# Patient Record
Sex: FEMALE | Race: WHITE | NOT HISPANIC OR LATINO | Employment: FULL TIME | URBAN - NONMETROPOLITAN AREA
[De-identification: names, ages, dates, MRNs, and addresses within clinical notes are randomized per-mention and may not be internally consistent; named-entity substitution may affect disease eponyms.]

---

## 2022-10-06 ENCOUNTER — NURSE TRIAGE (OUTPATIENT)
Dept: CALL CENTER | Facility: HOSPITAL | Age: 22
End: 2022-10-06

## 2022-10-06 NOTE — TELEPHONE ENCOUNTER
Caller is 30 weeks pregnant and is having flu symptoms.  She works from home and has had no known exposure to COVID or flu.  She has body aches, feels hot to the touch, and congestion.  Recommended calling OB GYN for instructions.  Reason for Disposition  • Patient is HIGH RISK (e.g., age > 64 years, pregnant, HIV+, or chronic medical condition)    Additional Information  • Negative: SEVERE difficulty breathing (e.g., struggling for each breath, speaks in single words)  • Negative: Difficult to awaken or acting confused (e.g., disoriented, slurred speech)  • Negative: Bluish (or gray) lips or face now  • Negative: Shock suspected (e.g., cold/pale/clammy skin, too weak to stand, low BP, rapid pulse)  • Negative: Sounds like a life-threatening emergency to the triager  • Negative: [1] Symptoms of COVID-19 (e.g., cough, fever, SOB, or others) AND [2] lives in an area with community spread  • Negative: [1] Sounds like a cold AND [2] no fever  • Negative: [1] Cough with sputum AND [2] no fever  • Negative: [1] Dry cough (no sputum) sputum AND [2] no fever  • Negative: [1] Throat pain AND [2] no fever  • Negative: Influenza vaccine reaction is suspected  • Negative: Chest pain  (Exception: MILD central chest pain, present only when coughing)  • Negative: [1] Headache AND [2] stiff neck (can't touch chin to chest)  • Negative: Fever > 104 F (40 C)  • Negative: [1] Difficulty breathing AND [2] not severe AND [3] not from stuffy nose (e.g., not relieved by cleaning out the nose)  • Negative: Patient sounds very sick or weak to the triager  • Negative: [1] Fever > 101 F (38.3 C) AND [2] age > 60 years  • Negative: [1] Fever > 100.0 F (37.8 C) AND [2] bedridden (e.g., nursing home patient, CVA, chronic illness, recovering from surgery)  • Negative: [1] Fever > 100.0 F (37.8 C) AND [2] diabetes mellitus or weak immune system (e.g., HIV positive, cancer chemo, splenectomy, organ transplant, chronic steroids)    Answer Assessment  "- Initial Assessment Questions  1. WORST SYMPTOM: \"What is your worst symptom?\" (e.g., cough, runny nose, muscle aches, headache, sore throat, fever)       Body aches  2. ONSET: \"When did your flu symptoms start?\"       Yesterday at 5 pm  3. COUGH: \"How bad is the cough?\"        mild  4. RESPIRATORY DISTRESS: \"Describe your breathing.\"       ok  5. FEVER: \"Do you have a fever?\" If Yes, ask: \"What is your temperature, how was it measured, and when did it start?\"      Feel hot  6. EXPOSURE: \"Were you exposed to someone with influenza?\"        no  7. FLU VACCINE: \"Did you get a flu shot this year?\"      no  8. HIGH RISK DISEASE: \"Do you any chronic medical problems?\" (e.g., heart or lung disease, asthma, weak immune system, or other HIGH RISK conditions)      Pregnant   9. PREGNANCY: \"Is there any chance you are pregnant?\" \"When was your last menstrual period?\"      30 weeks pregnant  10. OTHER SYMPTOMS: \"Do you have any other symptoms?\"  (e.g., runny nose, muscle aches, headache, sore throat)        no    Protocols used: INFLUENZA - SEASONAL-ADULT-AH    "

## 2024-02-29 ENCOUNTER — OFFICE VISIT (OUTPATIENT)
Dept: INTERNAL MEDICINE | Facility: CLINIC | Age: 24
End: 2024-02-29
Payer: MEDICAID

## 2024-02-29 VITALS
TEMPERATURE: 98.9 F | DIASTOLIC BLOOD PRESSURE: 78 MMHG | HEIGHT: 59 IN | WEIGHT: 173 LBS | BODY MASS INDEX: 34.88 KG/M2 | SYSTOLIC BLOOD PRESSURE: 108 MMHG | HEART RATE: 96 BPM

## 2024-02-29 DIAGNOSIS — Z13.21 ENCOUNTER FOR VITAMIN DEFICIENCY SCREENING: ICD-10-CM

## 2024-02-29 DIAGNOSIS — H93.8X3 SENSATION OF FULLNESS IN BOTH EARS: ICD-10-CM

## 2024-02-29 DIAGNOSIS — F41.9 ANXIETY: ICD-10-CM

## 2024-02-29 DIAGNOSIS — R09.81 NASAL CONGESTION: ICD-10-CM

## 2024-02-29 DIAGNOSIS — Z11.59 ENCOUNTER FOR HEPATITIS C SCREENING TEST FOR LOW RISK PATIENT: ICD-10-CM

## 2024-02-29 DIAGNOSIS — Z13.220 LIPID SCREENING: ICD-10-CM

## 2024-02-29 DIAGNOSIS — Z00.00 ENCOUNTER FOR MEDICAL EXAMINATION TO ESTABLISH CARE: Primary | ICD-10-CM

## 2024-02-29 DIAGNOSIS — Z13.29 SCREENING FOR ENDOCRINE DISORDER: ICD-10-CM

## 2024-02-29 DIAGNOSIS — J01.00 ACUTE NON-RECURRENT MAXILLARY SINUSITIS: ICD-10-CM

## 2024-02-29 RX ORDER — AZITHROMYCIN 250 MG/1
TABLET, FILM COATED ORAL
Qty: 6 TABLET | Refills: 0 | Status: SHIPPED | OUTPATIENT
Start: 2024-02-29

## 2024-02-29 RX ORDER — FLUTICASONE PROPIONATE 50 MCG
2 SPRAY, SUSPENSION (ML) NASAL DAILY
Qty: 18.2 ML | Refills: 1 | Status: SHIPPED | OUTPATIENT
Start: 2024-02-29

## 2024-02-29 NOTE — PROGRESS NOTES
Office Note     Name: Amarilys Solis    : 2000     MRN: [id number]   Care Team: Patient Care Team:  Adenike Doyle APRN as PCP - General (Family Medicine)    Chief Complaint  Sinus Problem (Sinus congestion, drainage, and ear pain ) and Hearing Problem (Left ear )    Subjective     History of Present Illness:  The patient is a 23-year-old female who is here as a new patient.    Health maintenance   She does not have a primary care provider. She has no concerns with her medical history. She has not had any surgeries. Her last labs were done when she was pregnant.     Anxiety   She was supposed to take medication when she had her second child, but she did not take it. She does not remember the name of the medication. She still experiences anxiety daily but is not currently taking any medication. She discussed finding a therapist with her OB/GYN, but they never followed up with her. She is interested in starting medication, but she is forgetful with taking it.     Congestion  The patient has congestion, popping in her ears, and decreased hearing. This has been ongoing for 3 weeks. She has used over-the-counter nasal spray and a shower vapor tablet without improvement. She denies a cough. She experiences frequent nasal congestion but is not frequently symptomatic in her ears. She denies any sinus pain or pressure, fever, or pain with swallowing. She has had a few headaches. She denies any allergies.      She vapes nicotine daily. She drinks alcohol socially, less than once weekly. She denies any drug use.     She has 2 children, who are 7 years old and 1 year old. She denies any complications during labor and delivery at [facility].      She denies any allergies.    Review of systems:  anxiety  nasal congestion  ears popping   decreased hearing  Past Medical History:   Diagnosis Date    Headache        Past Surgical History:   Procedure Laterality Date    DENTAL PROCEDURE    "      Social History     Socioeconomic History    Marital status: Single   Tobacco Use    Smoking status: Never    Smokeless tobacco: Never   Vaping Use    Vaping status: Every Day    Start date: 2/28/2022    Substances: Nicotine, Flavoring   Substance and Sexual Activity    Alcohol use: Yes     Comment: socially    Drug use: Never         Current Outpatient Medications:     azithromycin (Zithromax Z-Tyrell) 250 MG tablet, Take 2 tablets by mouth on day 1, then 1 tablet daily on days 2-5, Disp: 6 tablet, Rfl: 0    fluticasone (FLONASE) 50 MCG/ACT nasal spray, 2 sprays into the nostril(s) as directed by provider Daily., Disp: 18.2 mL, Rfl: 1    Procedures    PHQ-9 Total Score:      Objective     Vital Signs  /78 (BP Location: Right arm, Patient Position: Sitting, Cuff Size: Adult)   Pulse 96   Temp 98.9 °F (37.2 °C) (Temporal)   Ht 151 cm (59.45\")   Wt 78.5 kg (173 lb)   BMI 34.42 kg/m²   Estimated body mass index is 34.42 kg/m² as calculated from the following:    Height as of this encounter: 151 cm (59.45\").    Weight as of this encounter: 78.5 kg (173 lb).    BMI is >= 30 and <35. (Class 1 Obesity). The following options were offered after discussion;: exercise counseling/recommendations and nutrition counseling/recommendations      Physical Exam  Vitals and nursing note reviewed.   HENT:      Head: Normocephalic.      Right Ear: Tympanic membrane, ear canal and external ear normal.      Left Ear: Tympanic membrane, ear canal and external ear normal.      Nose: Congestion present.      Right Sinus: Maxillary sinus tenderness present.      Left Sinus: Maxillary sinus tenderness present.      Mouth/Throat:      Mouth: Mucous membranes are moist.      Pharynx: Posterior oropharyngeal erythema present.   Eyes:      Pupils: Pupils are equal, round, and reactive to light.   Cardiovascular:      Rate and Rhythm: Normal rate.   Pulmonary:      Effort: Pulmonary effort is normal.      Breath sounds: Normal breath " sounds.   Skin:     General: Skin is warm and dry.   Neurological:      General: No focal deficit present.      Mental Status: She is alert and oriented to person, place, and time.   Psychiatric:         Mood and Affect: Mood normal.         Behavior: Behavior normal.         Thought Content: Thought content normal.         Judgment: Judgment normal.              Assessment and Plan     Assessment/Plan:  Diagnoses and all orders for this visit:    1. Encounter for medical examination to establish care (Primary)  -     CBC & Differential; Future  -     Comprehensive Metabolic Panel; Future  -Labs pending.  -Discussed and encouraged Amarilys to continue to attempt an overall healthy well-balanced diet.  -Discussed and encouraged Amarilys to continue to attempt 30 minutes of moderate intensity exercise daily.    2. Anxiety  -     Ambulatory Referral to Behavioral Health  -     Pharmacogenetic Testing (PGT) - Saliva, Oral Cavity; Future    3. Nasal congestion    4. Sensation of fullness in both ears    5. Lipid screening  -     Lipid Panel; Future    6. Screening for endocrine disorder  -     TSH Rfx On Abnormal To Free T4; Future    7. Encounter for vitamin deficiency screening  -     Vitamin D,25-Hydroxy; Future  -     Vitamin B12; Future    8. Encounter for hepatitis C screening test for low risk patient  -     Hepatitis C Antibody; Future    9. Acute non-recurrent maxillary sinusitis  -     azithromycin (Zithromax Z-Tyrell) 250 MG tablet; Take 2 tablets by mouth on day 1, then 1 tablet daily on days 2-5  Dispense: 6 tablet; Refill: 0  -     fluticasone (FLONASE) 50 MCG/ACT nasal spray; 2 sprays into the nostril(s) as directed by provider Daily.  Dispense: 18.2 mL; Refill: 1      The patient will follow up in 1 month for an annual physical.  There are no Patient Instructions on file for this visit.  Plan of care reviewed with patient at the conclusion of today's visit. Education was provided regarding diagnosis, management  and any prescribed or recommended OTC medications.  Patient verbalizes understanding of and agreement with management plan.    Follow Up  Return in about 1 month (around 3/29/2024) for Annual Physical next visit.    CHARMAINE Lyles     Transcribed from ambient dictation for CHARMAINE Lyles by Connie Martinez.  02/29/24   16:49 EST    Patient or patient representative verbalized consent to the visit recording.  I have personally performed the services described in this document as transcribed by the above individual, and it is both accurate and complete.

## 2024-03-26 ENCOUNTER — OFFICE VISIT (OUTPATIENT)
Age: 24
End: 2024-03-26
Payer: MEDICAID

## 2024-03-26 DIAGNOSIS — F41.9 ANXIETY: Primary | ICD-10-CM

## 2024-03-26 DIAGNOSIS — F32.A DEPRESSION, UNSPECIFIED DEPRESSION TYPE: ICD-10-CM
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
## 2024-03-26 NOTE — PATIENT INSTRUCTIONS
This is not journaling prompts traditionally, but can lead to journaling.  Check it out and see what you think!        Progressive muscle relaxation script to listen:    Visit the website linked below and enter the code to access the Progressive Muscle Relaxation activity:    Website: [employer]/activity  Code: [email or web address]      See attached information on cognitive behavioral therapy.

## 2024-03-29 ENCOUNTER — LAB (OUTPATIENT)
Dept: LAB | Facility: HOSPITAL | Age: 24
End: 2024-03-29
Payer: MEDICAID

## 2024-03-29 ENCOUNTER — OFFICE VISIT (OUTPATIENT)
Dept: INTERNAL MEDICINE | Facility: CLINIC | Age: 24
End: 2024-03-29
Payer: MEDICAID

## 2024-03-29 VITALS
HEART RATE: 80 BPM | DIASTOLIC BLOOD PRESSURE: 62 MMHG | BODY MASS INDEX: 35.48 KG/M2 | TEMPERATURE: 98 F | SYSTOLIC BLOOD PRESSURE: 102 MMHG | WEIGHT: 176 LBS | HEIGHT: 59 IN

## 2024-03-29 DIAGNOSIS — Z11.59 ENCOUNTER FOR HEPATITIS C SCREENING TEST FOR LOW RISK PATIENT: ICD-10-CM

## 2024-03-29 DIAGNOSIS — F32.A DEPRESSION, UNSPECIFIED DEPRESSION TYPE: ICD-10-CM

## 2024-03-29 DIAGNOSIS — Z00.00 ANNUAL PHYSICAL EXAM: Primary | ICD-10-CM

## 2024-03-29 DIAGNOSIS — J35.8 CALCULUS OF TONSIL: ICD-10-CM

## 2024-03-29 DIAGNOSIS — Z13.220 LIPID SCREENING: ICD-10-CM

## 2024-03-29 DIAGNOSIS — Z13.1 DIABETES MELLITUS SCREENING: ICD-10-CM

## 2024-03-29 DIAGNOSIS — Z13.29 SCREENING FOR THYROID DISORDER: ICD-10-CM

## 2024-03-29 DIAGNOSIS — R53.83 FATIGUE, UNSPECIFIED TYPE: ICD-10-CM

## 2024-03-29 DIAGNOSIS — Z00.00 ANNUAL PHYSICAL EXAM: ICD-10-CM

## 2024-03-29 DIAGNOSIS — Z00.00 ENCOUNTER FOR MEDICAL EXAMINATION TO ESTABLISH CARE: ICD-10-CM

## 2024-03-29 DIAGNOSIS — Z13.21 ENCOUNTER FOR VITAMIN DEFICIENCY SCREENING: ICD-10-CM

## 2024-03-29 DIAGNOSIS — Z13.29 SCREENING FOR ENDOCRINE DISORDER: ICD-10-CM

## 2024-03-29 DIAGNOSIS — F41.9 ANXIETY: ICD-10-CM

## 2024-03-29 LAB — HBA1C MFR BLD: 5.2 % (ref 4.8–5.6)

## 2024-03-29 PROCEDURE — 82306 VITAMIN D 25 HYDROXY: CPT

## 2024-03-29 PROCEDURE — 86803 HEPATITIS C AB TEST: CPT

## 2024-03-29 PROCEDURE — 84439 ASSAY OF FREE THYROXINE: CPT

## 2024-03-29 PROCEDURE — 82607 VITAMIN B-12: CPT

## 2024-03-29 PROCEDURE — 80061 LIPID PANEL: CPT

## 2024-03-29 PROCEDURE — 36415 COLL VENOUS BLD VENIPUNCTURE: CPT

## 2024-03-29 PROCEDURE — 80050 GENERAL HEALTH PANEL: CPT

## 2024-03-29 PROCEDURE — 83036 HEMOGLOBIN GLYCOSYLATED A1C: CPT

## 2024-03-29 RX ORDER — FLUOXETINE HYDROCHLORIDE 20 MG/1
20 CAPSULE ORAL DAILY
Qty: 90 CAPSULE | Refills: 0 | Status: SHIPPED | OUTPATIENT
Start: 2024-03-29

## 2024-03-29 NOTE — PROGRESS NOTES
Female Annual Physical Note      Name: Amarilys Solis    : 2000     MRN: [id number]   Care Team:  Patient Care Team:  Adenike Doyle APRN as PCP - General (Family Medicine)    Chief Complaint  Annual Exam    Subjective     History of Present Illness:  ***DAXHPI      The patient is being seen for a health maintenance evaluation.    Past Medical History:   Diagnosis Date    Headache      Past Surgical History:   Procedure Laterality Date    DENTAL PROCEDURE       Family History   Problem Relation Age of Onset    Lupus Mother     Ovarian cancer Maternal Grandmother      Social History     Tobacco Use   Smoking Status Never   Smokeless Tobacco Never     No Known Allergies    Current Outpatient Medications:     azithromycin (Zithromax Z-Tyrell) 250 MG tablet, Take 2 tablets by mouth on day 1, then 1 tablet daily on days 2-5, Disp: 6 tablet, Rfl: 0    fluticasone (FLONASE) 50 MCG/ACT nasal spray, 2 sprays into the nostril(s) as directed by provider Daily., Disp: 18.2 mL, Rfl: 1      General History  Amarilys  does have regular dental visits.  She does not complain of vision problems. Last eye exam was 6 months ago.  Immunizations are not up to date. The patient needs the following immunizations: Covid/FLU (pt refused)    Lifestyle  Amarilys  consumes a mixture of healthy and unhealthy foods.  She exercises rarely.    Reproductive Health  Amarilys  is premenopausal.  She reports periods are irregular.   She is sexually active. Her contraceptive plan is condoms.    Screening  Last pap was 2023  Last Completed Pap Smear       This patient has no relevant Health Maintenance data.        . History of abnormal pap smear or family history of gyn cancer: no abnormal pap. Family hx of gynecological cancer, maternal grandmother had an unknown gyn cancer.    Last mammogram was n/a  Last Completed Mammogram       This patient has no relevant Health Maintenance data.        . Personal or family history of abnormal  mammograms or breast cancer: no fam hx  Last colonoscopy was n/a  Last Completed Colonoscopy       This patient has no relevant Health Maintenance data.        . Family history of colon cancer: no fam hx  Last DEXA was n/a.    PHQ-9 Total Score:      Health Maintenance Summary            Ordered - HEPATITIS C SCREENING (Once) Ordered on 2/29/2024      No completion, postpone, or frequency change history exists for this topic.              Overdue - ANNUAL PHYSICAL (Yearly) Never done      No completion, postpone, or frequency change history exists for this topic.              Overdue - PAP SMEAR (Every 3 Years) Never done      No completion, postpone, or frequency change history exists for this topic.              Overdue - INFLUENZA VACCINE (Yearly - August to March) Never done      No completion, postpone, or frequency change history exists for this topic.              Overdue - COVID-19 Vaccine (1 - 2023-24 season) Never done      No completion, postpone, or frequency change history exists for this topic.              BMI FOLLOWUP (Yearly) Next due on 2/28/2025 02/29/2024  SmartData: WORKFLOW - QUALITY MEASUREMENT - DOCUMENTED WEIGHT FOLLOW-UP PLAN              TDAP/TD VACCINES (3 - Td or Tdap) Next due on 9/19/2032 09/19/2022  Imm Admin: Tdap    07/15/2013  Imm Admin: Tdap              Pneumococcal Vaccine 0-64 (Series Information) Aged Out      05/03/2002  Imm Admin: PEDS-Pneumococcal Conjugate (PCV7)    02/14/2002  Imm Admin: PEDS-Pneumococcal Conjugate (PCV7)    2001  Imm Admin: PEDS-Pneumococcal Conjugate (PCV7)              MENINGOCOCCAL VACCINE (Series Information) Aged Out      07/15/2013  Imm Admin: MCV4 Unspecified              HPV VACCINES (Series Information) Completed      10/03/2023  Imm Admin: Hpv9    05/17/2023  Imm Admin: Hpv9    03/17/2023  Imm Admin: Hpv9    10/11/2018  Imm Admin: Hpv9                  Immunization History   Administered Date(s) Administered    DTaP,  "Unspecified 2001, 02/14/2002, 05/03/2002, 08/05/2002, 07/21/2006    Hep A, 2 Dose 07/15/2013, 10/11/2018    Hep B, Adolescent or Pediatric 2001, 02/14/2002, 08/05/2002    HiB 2001, 02/14/2002, 08/05/2002    Hpv9 10/11/2018, 03/17/2023, 05/17/2023, 10/03/2023    IPV 2001, 02/14/2002, 05/03/2002, 08/05/2002, 10/11/2018    MCV4 Unspecified 07/15/2013    MMR 08/15/2003, 07/21/2006    Meningococcal B,(Bexsero) 10/11/2018    PEDS-Pneumococcal Conjugate (PCV7) 2001, 02/14/2002, 05/03/2002    Tdap 07/15/2013, 09/19/2022    Varicella 08/15/2003, 07/15/2013       Objective     Vital Signs  There were no vitals filed for this visit.  Estimated body mass index is 34.42 kg/m² as calculated from the following:    Height as of 2/29/24: 151 cm (59.45\").    Weight as of 2/29/24: 78.5 kg (173 lb).            Physical Exam       ***DAXRESULTS  Assessment and Plan     There are no diagnoses linked to this encounter.  ***DAXPLAN    There are no Patient Instructions on file for this visit.    Counseling/Anticipatory Guidance:   Plan of care reviewed with patient at the conclusion of today's visit. Education was provided in regards to diagnosis, diet and exercise, cervical cancer screening, self breast exams, breast cancer screening, and the importance of yearly mammograms.   Nutrition, family planning/contraception, physical activity, healthy weight,ways to reduce stress, adequate sleep, injury prevention, misuse of tobacco, alcohol and drugs, sexual behavior and STD's, dental health, mental health, and immunizations.    Management and any prescribed or recommended OTC medications.  Patient verbalizes understanding of and agreement with management plan.    Follow Up  No follow-ups on file.    CHARMAINE Martino  [facility] Medicine     "

## 2024-03-29 NOTE — PROGRESS NOTES
Female Annual Physical Note      Name: Amarilys Solis    : 2000     MRN: [id number]   Care Team:  Patient Care Team:  Adenike Doyle APRN as PCP - General (Family Medicine)    Chief Complaint  Annual Exam    Subjective     History of Present Illness:  The patient is a 23-year-old female who is here for an annual physical.    Tonsil stones  She had a doctor's appointment a few weeks ago, but she forgot to mention a couple of concerns. She has tonsil stones that recur in less than 1 week. They are bothersome and have been an issue for years. She bought a kit from [employer] but was unable to resolve them herself.     Fatigue  She has low energy and fatigue, She is not sure if she has had labs.    Anxiety and depression  The patient is interested in medication for her anxiety and depression. She took Zoloft when she had postpartum, and it worked well for her. She is not currently pregnant or breastfeeding. She has discussed anxiety with her previous provider.     Health maintenance   The patient has regular dentist appointments and has eye examinations as needed. She denies any vision problems with her glasses on. Her last eye examination was 6 months ago. Her diet consists of chicken, grilled beef, vegetables, and fruits. Her diet is both healthy and unhealthy. She rarely exercises. Her menstrual cycles have been abnormal since her 2 pregnancies. She stopped taking birth control in 2023 and has had 1 menstrual cycle then. She is sexually active and uses condoms. Her last Pap smear was in 2023 or 2023, which was normal. She did an STD screening that was positive for oral herpes simplex virus. She has not had any cold sores on her mouth. She denies that any cancer cells were found.    She vapes, which contains nicotine. She is interested in cessation.     Her maternal grandmother had gynecological cancers. She denies any family history of breast cancer or colon cancer.    She has not had an  influenza vaccine this season or any of the COVID-19 vaccines, which she declines interest in receiving.       The patient is being seen for a health maintenance evaluation.    Past Medical History:   Diagnosis Date    Headache      Past Surgical History:   Procedure Laterality Date    DENTAL PROCEDURE  2015     Family History   Problem Relation Age of Onset    Lupus Mother     Ovarian cancer Maternal Grandmother      Social History     Tobacco Use   Smoking Status Never   Smokeless Tobacco Never     No Known Allergies    Current Outpatient Medications:     FLUoxetine (PROzac) 20 MG capsule, Take 1 capsule by mouth Daily., Disp: 90 capsule, Rfl: 0    vitamin D (ERGOCALCIFEROL) 1.25 MG (90919 UT) capsule capsule, Take 1 capsule by mouth 1 (One) Time Per Week., Disp: 5 capsule, Rfl: 2      General History  Amarilys  does have regular dental visits.  She does not complain of vision problems. Last eye exam was 6 months ago.  Immunizations are not up to date. The patient needs the following immunizations: Covid/FLU (pt refused)    Lifestyle  Amarilys  consumes a mixture of healthy and unhealthy foods.  She exercises rarely.    Reproductive Health  Amarilys  is premenopausal.  She reports periods are irregular.   She is sexually active. Her contraceptive plan is condoms.    Screening  Last pap was July 2023  Last Completed Pap Smear            PAP SMEAR (Every 3 Years) Next due on 8/1/2026 08/01/2023  Done                . History of abnormal pap smear or family history of gyn cancer: no abnormal pap. Family hx of gynecological cancer, maternal grandmother had an unknown gyn cancer.    Last mammogram was n/a  Last Completed Mammogram       This patient has no relevant Health Maintenance data.        . Personal or family history of abnormal mammograms or breast cancer: no fam hx  Last colonoscopy was n/a  Last Completed Colonoscopy       This patient has no relevant Health Maintenance data.        . Family history of colon  cancer: no fam hx  Last DEXA was n/a.    PHQ-9 Total Score:      Health Maintenance Summary            Overdue - COVID-19 Vaccine (1 - 2023-24 season) Never done      03/29/2024  Postponed until 3/31/2024 by Debo Veras APRN Student (Patient Refused)              INFLUENZA VACCINE (Yearly - August to March) Next due on 8/1/2024 03/29/2024  Postponed until 3/31/2024 by Debo Veras APRN Student (Patient Refused)              BMI FOLLOWUP (Yearly) Next due on 2/28/2025 02/29/2024  SmartData: WORKFLOW - QUALITY MEASUREMENT - DOCUMENTED WEIGHT FOLLOW-UP PLAN              ANNUAL PHYSICAL (Yearly) Next due on 3/29/2025      03/29/2024  Done              PAP SMEAR (Every 3 Years) Next due on 8/1/2026 08/01/2023  Done              TDAP/TD VACCINES (3 - Td or Tdap) Next due on 9/19/2032 09/19/2022  Imm Admin: Tdap    07/15/2013  Imm Admin: Tdap              Pneumococcal Vaccine 0-64 (Series Information) Aged Out      05/03/2002  Imm Admin: PEDS-Pneumococcal Conjugate (PCV7)    02/14/2002  Imm Admin: PEDS-Pneumococcal Conjugate (PCV7)    2001  Imm Admin: PEDS-Pneumococcal Conjugate (PCV7)              MENINGOCOCCAL VACCINE (Series Information) Aged Out      07/15/2013  Imm Admin: MCV4 Unspecified              HPV VACCINES (Series Information) Completed      10/03/2023  Imm Admin: Hpv9    05/17/2023  Imm Admin: Hpv9    03/17/2023  Imm Admin: Hpv9    10/11/2018  Imm Admin: Hpv9              HEPATITIS C SCREENING  Completed      03/29/2024  Hepatitis C Antibody                  Immunization History   Administered Date(s) Administered    DTaP, Unspecified 2001, 02/14/2002, 05/03/2002, 08/05/2002, 07/21/2006    Hep A, 2 Dose 07/15/2013, 10/11/2018    Hep B, Adolescent or Pediatric 2001, 02/14/2002, 08/05/2002    HiB 2001, 02/14/2002, 08/05/2002    Hpv9 10/11/2018, 03/17/2023, 05/17/2023, 10/03/2023    IPV 2001, 02/14/2002, 05/03/2002, 08/05/2002, 10/11/2018    [facility] [place]  "Unspecified 07/15/2013    MMR 08/15/2003, 07/21/2006    Meningococcal B,(Bexsero) 10/11/2018    PEDS-Pneumococcal Conjugate (PCV7) 2001, 02/14/2002, 05/03/2002    Tdap 07/15/2013, 09/19/2022    Varicella 08/15/2003, 07/15/2013       Objective     Vital Signs  Vitals:    03/29/24 1018   BP: 102/62   BP Location: Left arm   Patient Position: Sitting   Cuff Size: Adult   Pulse: 80   Temp: 98 °F (36.7 °C)   TempSrc: Temporal   Weight: 79.8 kg (176 lb)   Height: 151 cm (59.45\")   PainSc: 0-No pain     Estimated body mass index is 35.01 kg/m² as calculated from the following:    Height as of this encounter: 151 cm (59.45\").    Weight as of this encounter: 79.8 kg (176 lb).            Physical Exam  HENT:      Head: Normocephalic.      Right Ear: Tympanic membrane, ear canal and external ear normal.      Left Ear: Tympanic membrane, ear canal and external ear normal.      Nose: Nose normal.      Mouth/Throat:      Mouth: Mucous membranes are moist.      Pharynx: Oropharynx is clear.   Eyes:      Pupils: Pupils are equal, round, and reactive to light.   Cardiovascular:      Rate and Rhythm: Normal rate and regular rhythm.   Pulmonary:      Effort: Pulmonary effort is normal.      Breath sounds: Normal breath sounds.   Abdominal:      General: Bowel sounds are normal.      Palpations: Abdomen is soft.   Musculoskeletal:         General: Normal range of motion.      Cervical back: Normal range of motion and neck supple.   Skin:     General: Skin is warm and dry.   Neurological:      Mental Status: She is alert and oriented to person, place, and time.   Psychiatric:         Mood and Affect: Mood normal.         Behavior: Behavior normal.         Thought Content: Thought content normal.         Judgment: Judgment normal.            Assessment and Plan     Diagnoses and all orders for this visit:    1. Annual physical exam (Primary)  -     CBC w AUTO Differential; Future  -     Comprehensive metabolic panel; Future  -Labs " pending.  -Discussed and encouraged Amarilys to continue to attempt an overall healthy well-balanced diet.  -Discussed and encouraged Amarilys to continue to attempt 30 minutes of moderate intensity exercise daily.  -Encouraged smoking/vaping cessation.    2. Anxiety  -     FLUoxetine (PROzac) 20 MG capsule; Take 1 capsule by mouth Daily.  Dispense: 90 capsule; Refill: 0  -Discussed results of recent pharmacogenetics testing.  -Will start fluoxetine 20 mg daily.  Discussed and educated that it may take up to 8 weeks for full effectiveness.  Discussed potential side effects.  Encouraged Amarilys to take medication daily and to not skip doses.  -Will return in 2-3 months to evaluate effectiveness.    3. Depression, unspecified depression type  -     FLUoxetine (PROzac) 20 MG capsule; Take 1 capsule by mouth Daily.  Dispense: 90 capsule; Refill: 0  -Same as above.    4. Fatigue, unspecified type    5. Calculus of tonsil  -     Ambulatory Referral to ENT (Otolaryngology)    6. Encounter for vitamin deficiency screening  -     Vitamin B12; Future  -     Vitamin D 25 hydroxy; Future    7. Screening for thyroid disorder  -     T4, free; Future    8. Encounter for hepatitis C screening test for low risk patient  -     Hepatitis C antibody; Future    9. Diabetes mellitus screening  -     Hemoglobin A1c; Future        The patient will follow up in 2 to 3 months.    There are no Patient Instructions on file for this visit.    Counseling/Anticipatory Guidance:   Plan of care reviewed with patient at the conclusion of today's visit. Education was provided in regards to diagnosis, diet and exercise, cervical cancer screening, self breast exams, breast cancer screening, and the importance of yearly mammograms.   Nutrition, family planning/contraception, physical activity, healthy weight,ways to reduce stress, adequate sleep, injury prevention, misuse of tobacco, alcohol and drugs, sexual behavior and STD's, dental health, mental health,  and immunizations.    Management and any prescribed or recommended OTC medications.  Patient verbalizes understanding of and agreement with management plan.    Follow Up  Return in about 3 months (around 6/29/2024) for Recheck.    CHARMAINE Martino  [facility] Internal Medicine     Transcribed from ambient dictation for CHARMAINE Lyles by Connie Martinez.  03/29/24   11:44 EDT    Patient or patient representative verbalized consent to the visit recording.  I have personally performed the services described in this document as transcribed by the above individual, and it is both accurate and complete.

## 2024-03-30 LAB
25(OH)D3 SERPL-MCNC: 16.4 NG/ML (ref 30–100)
ALBUMIN SERPL-MCNC: 4.3 G/DL (ref 3.5–5.2)
ALBUMIN/GLOB SERPL: 1.4 G/DL
ALP SERPL-CCNC: 78 U/L (ref 39–117)
ALT SERPL W P-5'-P-CCNC: 24 U/L (ref 1–33)
ANION GAP SERPL CALCULATED.3IONS-SCNC: 11.6 MMOL/L (ref 5–15)
AST SERPL-CCNC: 21 U/L (ref 1–32)
BASOPHILS # BLD AUTO: 0.03 10*3/MM3 (ref 0–0.2)
BASOPHILS NFR BLD AUTO: 0.6 % (ref 0–1.5)
BILIRUB SERPL-MCNC: 0.2 MG/DL (ref 0–1.2)
BUN SERPL-MCNC: 10 MG/DL (ref 6–20)
BUN/CREAT SERPL: 16.4 (ref 7–25)
CALCIUM SPEC-SCNC: 9.5 MG/DL (ref 8.6–10.5)
CHLORIDE SERPL-SCNC: 106 MMOL/L (ref 98–107)
CHOLEST SERPL-MCNC: 165 MG/DL (ref 0–200)
CO2 SERPL-SCNC: 21.4 MMOL/L (ref 22–29)
CREAT SERPL-MCNC: 0.61 MG/DL (ref 0.57–1)
DEPRECATED RDW RBC AUTO: 41 FL (ref 37–54)
EGFRCR SERPLBLD CKD-EPI 2021: 129.8 ML/MIN/1.73
EOSINOPHIL # BLD AUTO: 0.12 10*3/MM3 (ref 0–0.4)
EOSINOPHIL NFR BLD AUTO: 2.3 % (ref 0.3–6.2)
ERYTHROCYTE [DISTWIDTH] IN BLOOD BY AUTOMATED COUNT: 12.4 % (ref 12.3–15.4)
GLOBULIN UR ELPH-MCNC: 3 GM/DL
GLUCOSE SERPL-MCNC: 73 MG/DL (ref 65–99)
HCT VFR BLD AUTO: 41.6 % (ref 34–46.6)
HCV AB SER DONR QL: NORMAL
HDLC SERPL-MCNC: 48 MG/DL (ref 40–60)
HGB BLD-MCNC: 13.4 G/DL (ref 12–15.9)
IMM GRANULOCYTES # BLD AUTO: 0.02 10*3/MM3 (ref 0–0.05)
IMM GRANULOCYTES NFR BLD AUTO: 0.4 % (ref 0–0.5)
LDLC SERPL CALC-MCNC: 103 MG/DL (ref 0–100)
LDLC/HDLC SERPL: 2.14 {RATIO}
LYMPHOCYTES # BLD AUTO: 1.54 10*3/MM3 (ref 0.7–3.1)
LYMPHOCYTES NFR BLD AUTO: 29.7 % (ref 19.6–45.3)
MCH RBC QN AUTO: 28.9 PG (ref 26.6–33)
MCHC RBC AUTO-ENTMCNC: 32.2 G/DL (ref 31.5–35.7)
MCV RBC AUTO: 89.7 FL (ref 79–97)
MONOCYTES # BLD AUTO: 0.51 10*3/MM3 (ref 0.1–0.9)
MONOCYTES NFR BLD AUTO: 9.8 % (ref 5–12)
NEUTROPHILS NFR BLD AUTO: 2.96 10*3/MM3 (ref 1.7–7)
NEUTROPHILS NFR BLD AUTO: 57.2 % (ref 42.7–76)
NRBC BLD AUTO-RTO: 0 /100 WBC (ref 0–0.2)
PLATELET # BLD AUTO: 252 10*3/MM3 (ref 140–450)
PMV BLD AUTO: 11.2 FL (ref 6–12)
POTASSIUM SERPL-SCNC: 4.2 MMOL/L (ref 3.5–5.2)
PROT SERPL-MCNC: 7.3 G/DL (ref 6–8.5)
RBC # BLD AUTO: 4.64 10*6/MM3 (ref 3.77–5.28)
SODIUM SERPL-SCNC: 139 MMOL/L (ref 136–145)
T4 FREE SERPL-MCNC: 1.14 NG/DL (ref 0.93–1.7)
TRIGL SERPL-MCNC: 71 MG/DL (ref 0–150)
TSH SERPL DL<=0.05 MIU/L-ACNC: 1.74 UIU/ML (ref 0.27–4.2)
VIT B12 BLD-MCNC: 539 PG/ML (ref 211–946)
VLDLC SERPL-MCNC: 14 MG/DL (ref 5–40)
WBC NRBC COR # BLD AUTO: 5.18 10*3/MM3 (ref 3.4–10.8)

## 2024-04-01 DIAGNOSIS — E55.9 VITAMIN D DEFICIENCY: Primary | ICD-10-CM

## 2024-04-01 RX ORDER — ERGOCALCIFEROL 1.25 MG/1
50000 CAPSULE ORAL WEEKLY
Qty: 5 CAPSULE | Refills: 2 | Status: SHIPPED | OUTPATIENT
Start: 2024-04-01

## 2024-04-10 DIAGNOSIS — E55.9 VITAMIN D DEFICIENCY: ICD-10-CM

## 2024-04-10 RX ORDER — ERGOCALCIFEROL 1.25 MG/1
50000 CAPSULE ORAL WEEKLY
Qty: 4 CAPSULE | Refills: 1 | Status: SHIPPED | OUTPATIENT
Start: 2024-04-10

## 2024-05-16 ENCOUNTER — OFFICE VISIT (OUTPATIENT)
Dept: INTERNAL MEDICINE | Facility: CLINIC | Age: 24
End: 2024-05-16
Payer: MEDICAID

## 2024-05-16 VITALS
SYSTOLIC BLOOD PRESSURE: 110 MMHG | BODY MASS INDEX: 34.47 KG/M2 | WEIGHT: 171 LBS | TEMPERATURE: 97.7 F | DIASTOLIC BLOOD PRESSURE: 76 MMHG | HEART RATE: 76 BPM | HEIGHT: 59 IN

## 2024-05-16 DIAGNOSIS — J02.9 SORE THROAT: Primary | ICD-10-CM

## 2024-05-16 LAB
EXPIRATION DATE: NORMAL
INTERNAL CONTROL: NORMAL
Lab: NORMAL
S PYO AG THROAT QL: NEGATIVE

## 2024-05-16 PROCEDURE — 99213 OFFICE O/P EST LOW 20 MIN: CPT

## 2024-05-16 PROCEDURE — 87880 STREP A ASSAY W/OPTIC: CPT

## 2024-05-16 PROCEDURE — 1160F RVW MEDS BY RX/DR IN RCRD: CPT

## 2024-05-16 PROCEDURE — 1159F MED LIST DOCD IN RCRD: CPT

## 2024-05-16 PROCEDURE — 1126F AMNT PAIN NOTED NONE PRSNT: CPT

## 2024-05-16 NOTE — PROGRESS NOTES
"    Acute Office Visit      Name: Amarilys Solis    : 2000     MRN: [id number]   Care Team: Patient Care Team:  Adenike Doyle APRN as PCP - General (Family Medicine)    Chief Complaint  Sore Throat (X 2 days)    Subjective     History of Present Illness:  The patient is a 23-year-old female who presents for evaluation of pharyngitis.     Pharyngitis  She reports throat irritation, which she describes as more of an annoyance rather than a severe discomfort. Upon self-examination of her throat yesterday she saw white patches that did not appear to look like tonsil stones.     She denies experiencing rhinorrhea, headaches, fever, cough, congestion, or sinus pressure. She also denies any otalgia or recent exposure to sick individuals.      Review of Systems:    Past Medical History:   Diagnosis Date    Headache        Past Surgical History:   Procedure Laterality Date    DENTAL PROCEDURE         Social History     Socioeconomic History    Marital status: Single   Tobacco Use    Smoking status: Never    Smokeless tobacco: Never   Vaping Use    Vaping status: Every Day    Start date: 2022    Substances: Nicotine, Flavoring   Substance and Sexual Activity    Alcohol use: Yes     Comment: socially    Drug use: Never         Current Outpatient Medications:     FLUoxetine (PROzac) 20 MG capsule, Take 1 capsule by mouth Daily., Disp: 90 capsule, Rfl: 0    vitamin D (ERGOCALCIFEROL) 1.25 MG (05849 UT) capsule capsule, TAKE 1 CAPSULE BY MOUTH ONCE WEEKLY (Patient not taking: Reported on 2024), Disp: 4 capsule, Rfl: 1    Procedures    PHQ-9 Total Score:      Objective     Vital Signs  /76   Pulse 76   Temp 97.7 °F (36.5 °C)   Ht 151 cm (59.45\")   Wt 77.6 kg (171 lb)   BMI 34.02 kg/m²   Estimated body mass index is 34.02 kg/m² as calculated from the following:    Height as of this encounter: 151 cm (59.45\").    Weight as of this encounter: 77.6 kg (171 lb).            Physical Exam  Vitals " reviewed.   Constitutional:       Appearance: Normal appearance.   HENT:      Head: Normocephalic.      Right Ear: External ear normal.      Left Ear: External ear normal.      Nose: Nose normal.      Mouth/Throat:      Tonsils: Tonsillar exudate present. 2+ on the right. 2+ on the left.   Cardiovascular:      Rate and Rhythm: Normal rate and regular rhythm.      Heart sounds: Normal heart sounds.   Pulmonary:      Effort: Pulmonary effort is normal.      Breath sounds: Normal breath sounds.   Abdominal:      General: Bowel sounds are normal.      Palpations: Abdomen is soft.   Musculoskeletal:         General: Normal range of motion.      Cervical back: Normal range of motion.   Skin:     General: Skin is warm and dry.   Neurological:      Mental Status: She is alert. Mental status is at baseline.   Psychiatric:         Mood and Affect: Mood normal.         Behavior: Behavior normal.         Thought Content: Thought content normal.         Judgment: Judgment normal.              Assessment and Plan     Assessment/Plan:  Diagnoses and all orders for this visit:    1. Sore throat (Primary)  -     POCT rapid strep A  - Her streptococcal pharyngitis culture yielded a negative result, suggesting a possible exposure to viral etiology.   - She was advised to perform saltwater rinses, suck on hard candy, and consume hot tea or cold drinks.   - Should there be no improvement in the patient's condition within a few days, she is to inform me.    There are no Patient Instructions on file for this visit.  Plan of care reviewed with patient at the conclusion of today's visit. Education was provided regarding diagnosis, management and any prescribed or recommended OTC medications.  Patient verbalizes understanding of and agreement with management plan.    Follow Up  Return for Next Scheduled Follow up.    CHARMAINE Lyles     Transcribed from ambient dictation for CHARMAINE Lyles by Diya Castellon.  05/16/24   10:15  EDT    Patient or patient representative verbalized consent to the visit recording.  I have personally performed the services described in this document as transcribed by the above individual, and it is both accurate and complete.

## 2024-06-27 ENCOUNTER — OFFICE VISIT (OUTPATIENT)
Dept: INTERNAL MEDICINE | Facility: CLINIC | Age: 24
End: 2024-06-27
Payer: MEDICAID

## 2024-06-27 VITALS
HEART RATE: 96 BPM | DIASTOLIC BLOOD PRESSURE: 62 MMHG | WEIGHT: 173 LBS | TEMPERATURE: 98.2 F | HEIGHT: 59 IN | BODY MASS INDEX: 34.88 KG/M2 | SYSTOLIC BLOOD PRESSURE: 98 MMHG

## 2024-06-27 DIAGNOSIS — F32.A DEPRESSION, UNSPECIFIED DEPRESSION TYPE: ICD-10-CM

## 2024-06-27 DIAGNOSIS — F41.9 ANXIETY: Primary | ICD-10-CM

## 2024-06-27 PROCEDURE — 99213 OFFICE O/P EST LOW 20 MIN: CPT

## 2024-06-27 PROCEDURE — 1159F MED LIST DOCD IN RCRD: CPT

## 2024-06-27 PROCEDURE — 1126F AMNT PAIN NOTED NONE PRSNT: CPT

## 2024-06-27 PROCEDURE — 1160F RVW MEDS BY RX/DR IN RCRD: CPT

## 2024-06-27 NOTE — PROGRESS NOTES
"    Office Note     Name: Amarilys Solis    : 2000     MRN: [id number]   Care Team: Patient Care Team:  Adenike Doyle APRN as PCP - General (Family Medicine)    Chief Complaint  Anxiety and Depression    Subjective     History of Present Illness:    Amarilys is a pleasant 24-year-old female who comes to the office today for a follow-up related to anxiety and depression.    Amarilys states that she has not been consistently taking the fluoxetine that was ordered approximately 3 months ago.  She took it daily for about 4 weeks, then has not taken it over the last 2 months.  She states that at the time, she was experiencing a significant elevation in stressors.  She states that those have improved however, still has anxiety or \"zoning out\" at times.  She denies crying spells.    She states that her infant son started  today which makes her kind of sad.  She is currently on unemployment.    Amarilys states that she did have her initial appointment with therapy however, did not go to her follow-up appointment and did not reschedule with behavioral health.    Past Medical History:   Diagnosis Date    Headache        Past Surgical History:   Procedure Laterality Date    DENTAL PROCEDURE         Social History     Socioeconomic History    Marital status: Single   Tobacco Use    Smoking status: Never    Smokeless tobacco: Never   Vaping Use    Vaping status: Every Day    Start date: 2022    Substances: Nicotine, Flavoring    Devices: Disposable, Pre-filled pod   Substance and Sexual Activity    Alcohol use: Yes     Comment: socially    Drug use: Never         Current Outpatient Medications:     FLUoxetine (PROzac) 20 MG capsule, Take 1 capsule by mouth Daily. (Patient not taking: Reported on 2024), Disp: 90 capsule, Rfl: 0    Procedures    PHQ-9 Total Score:      Objective     Vital Signs  BP 98/62 (BP Location: Left arm, Patient Position: Sitting, Cuff Size: Adult)   Pulse 96   Temp 98.2 °F (36.8 " "°C) (Temporal)   Ht 151 cm (59.45\")   Wt 78.5 kg (173 lb)   BMI 34.42 kg/m²   Estimated body mass index is 34.42 kg/m² as calculated from the following:    Height as of this encounter: 151 cm (59.45\").    Weight as of this encounter: 78.5 kg (173 lb).            Physical Exam  Vitals and nursing note reviewed.   HENT:      Head: Normocephalic.   Cardiovascular:      Rate and Rhythm: Normal rate.   Pulmonary:      Effort: Pulmonary effort is normal.      Breath sounds: Normal breath sounds.   Skin:     General: Skin is warm and dry.   Neurological:      General: No focal deficit present.      Mental Status: She is alert and oriented to person, place, and time.   Psychiatric:         Mood and Affect: Mood normal.         Behavior: Behavior normal.         Thought Content: Thought content normal.         Judgment: Judgment normal.          Assessment and Plan     Assessment/Plan:  Diagnoses and all orders for this visit:    1. Anxiety (Primary)  -Encouraged to restart fluoxetine 20 mg daily.  -Encouraged to reschedule therapy with behavioral health.  -Discussed and encouraged consistent medication usage for overall benefit and improvement of symptoms.  -Discussed and encouraged vape cessation.  -Will return in 3 months for follow-up on effectiveness.  -Discussed and encouraged nonpharmacological interventions for anxiety/depression such as therapy, journaling, breathing apps, meditation.    2. Depression, unspecified depression type  -Same as above.     There are no Patient Instructions on file for this visit.  Plan of care reviewed with patient at the conclusion of today's visit. Education was provided regarding diagnosis, management and any prescribed or recommended OTC medications.  Patient verbalizes understanding of and agreement with management plan.    Follow Up  Return in about 3 months (around 9/27/2024) for Recheck.    CHARMAINE Lyles     "